# Patient Record
Sex: MALE | Race: WHITE | NOT HISPANIC OR LATINO | Employment: FULL TIME | URBAN - METROPOLITAN AREA
[De-identification: names, ages, dates, MRNs, and addresses within clinical notes are randomized per-mention and may not be internally consistent; named-entity substitution may affect disease eponyms.]

---

## 2024-09-16 ENCOUNTER — HOSPITAL ENCOUNTER (EMERGENCY)
Facility: HOSPITAL | Age: 34
Discharge: HOME | End: 2024-09-16
Attending: EMERGENCY MEDICINE
Payer: COMMERCIAL

## 2024-09-16 VITALS
HEART RATE: 77 BPM | SYSTOLIC BLOOD PRESSURE: 164 MMHG | HEIGHT: 72 IN | OXYGEN SATURATION: 99 % | RESPIRATION RATE: 17 BRPM | BODY MASS INDEX: 33.14 KG/M2 | DIASTOLIC BLOOD PRESSURE: 103 MMHG | WEIGHT: 244.69 LBS | TEMPERATURE: 98 F

## 2024-09-16 DIAGNOSIS — R10.13 EPIGASTRIC PAIN: ICD-10-CM

## 2024-09-16 DIAGNOSIS — K85.90 ACUTE PANCREATITIS WITHOUT INFECTION OR NECROSIS, UNSPECIFIED PANCREATITIS TYPE: Primary | ICD-10-CM

## 2024-09-16 LAB
ALBUMIN SERPL BCP-MCNC: 4.6 G/DL (ref 3.5–5.2)
ALP SERPL-CCNC: 57 U/L (ref 55–135)
ALT SERPL W/O P-5'-P-CCNC: 137 U/L (ref 10–44)
ANION GAP SERPL CALC-SCNC: 14 MMOL/L (ref 8–16)
AST SERPL-CCNC: 67 U/L (ref 10–40)
BASOPHILS # BLD AUTO: 0.04 K/UL (ref 0–0.2)
BASOPHILS NFR BLD: 0.5 % (ref 0–1.9)
BILIRUB SERPL-MCNC: 0.6 MG/DL (ref 0.1–1)
BUN SERPL-MCNC: 13 MG/DL (ref 6–20)
CALCIUM SERPL-MCNC: 10.5 MG/DL (ref 8.7–10.5)
CHLORIDE SERPL-SCNC: 101 MMOL/L (ref 95–110)
CO2 SERPL-SCNC: 24 MMOL/L (ref 23–29)
CREAT SERPL-MCNC: 0.9 MG/DL (ref 0.5–1.4)
DIFFERENTIAL METHOD BLD: ABNORMAL
EOSINOPHIL # BLD AUTO: 0.1 K/UL (ref 0–0.5)
EOSINOPHIL NFR BLD: 1.3 % (ref 0–8)
ERYTHROCYTE [DISTWIDTH] IN BLOOD BY AUTOMATED COUNT: 12.1 % (ref 11.5–14.5)
EST. GFR  (NO RACE VARIABLE): >60 ML/MIN/1.73 M^2
GLUCOSE SERPL-MCNC: 133 MG/DL (ref 70–110)
HCT VFR BLD AUTO: 45.1 % (ref 40–54)
HGB BLD-MCNC: 16.5 G/DL (ref 14–18)
IMM GRANULOCYTES # BLD AUTO: 0.02 K/UL (ref 0–0.04)
IMM GRANULOCYTES NFR BLD AUTO: 0.3 % (ref 0–0.5)
LIPASE SERPL-CCNC: 281 U/L (ref 4–60)
LYMPHOCYTES # BLD AUTO: 1.7 K/UL (ref 1–4.8)
LYMPHOCYTES NFR BLD: 22.5 % (ref 18–48)
MCH RBC QN AUTO: 34.9 PG (ref 27–31)
MCHC RBC AUTO-ENTMCNC: 36.6 G/DL (ref 32–36)
MCV RBC AUTO: 95 FL (ref 82–98)
MONOCYTES # BLD AUTO: 0.5 K/UL (ref 0.3–1)
MONOCYTES NFR BLD: 6 % (ref 4–15)
NEUTROPHILS # BLD AUTO: 5.3 K/UL (ref 1.8–7.7)
NEUTROPHILS NFR BLD: 69.4 % (ref 38–73)
NRBC BLD-RTO: 0 /100 WBC
OHS QRS DURATION: 94 MS
OHS QTC CALCULATION: 450 MS
PLATELET # BLD AUTO: 268 K/UL (ref 150–450)
PMV BLD AUTO: 9.3 FL (ref 9.2–12.9)
POTASSIUM SERPL-SCNC: 3.7 MMOL/L (ref 3.5–5.1)
PROT SERPL-MCNC: 8.1 G/DL (ref 6–8.4)
RBC # BLD AUTO: 4.73 M/UL (ref 4.6–6.2)
SODIUM SERPL-SCNC: 139 MMOL/L (ref 136–145)
WBC # BLD AUTO: 7.69 K/UL (ref 3.9–12.7)

## 2024-09-16 PROCEDURE — 99285 EMERGENCY DEPT VISIT HI MDM: CPT | Mod: 25

## 2024-09-16 PROCEDURE — 25000003 PHARM REV CODE 250: Performed by: REGISTERED NURSE

## 2024-09-16 PROCEDURE — 93005 ELECTROCARDIOGRAM TRACING: CPT

## 2024-09-16 PROCEDURE — 96376 TX/PRO/DX INJ SAME DRUG ADON: CPT

## 2024-09-16 PROCEDURE — 25500020 PHARM REV CODE 255: Performed by: PHYSICIAN ASSISTANT

## 2024-09-16 PROCEDURE — 63600175 PHARM REV CODE 636 W HCPCS: Performed by: REGISTERED NURSE

## 2024-09-16 PROCEDURE — 96375 TX/PRO/DX INJ NEW DRUG ADDON: CPT

## 2024-09-16 PROCEDURE — 80053 COMPREHEN METABOLIC PANEL: CPT | Performed by: PHYSICIAN ASSISTANT

## 2024-09-16 PROCEDURE — 63600175 PHARM REV CODE 636 W HCPCS: Performed by: NURSE PRACTITIONER

## 2024-09-16 PROCEDURE — 83690 ASSAY OF LIPASE: CPT | Performed by: PHYSICIAN ASSISTANT

## 2024-09-16 PROCEDURE — 96374 THER/PROPH/DIAG INJ IV PUSH: CPT | Mod: 59

## 2024-09-16 PROCEDURE — 93010 ELECTROCARDIOGRAM REPORT: CPT | Mod: ,,, | Performed by: INTERNAL MEDICINE

## 2024-09-16 PROCEDURE — 85025 COMPLETE CBC W/AUTO DIFF WBC: CPT | Performed by: PHYSICIAN ASSISTANT

## 2024-09-16 RX ORDER — HYDROMORPHONE HYDROCHLORIDE 1 MG/ML
0.5 INJECTION, SOLUTION INTRAMUSCULAR; INTRAVENOUS; SUBCUTANEOUS
Status: COMPLETED | OUTPATIENT
Start: 2024-09-16 | End: 2024-09-16

## 2024-09-16 RX ORDER — ONDANSETRON 4 MG/1
4 TABLET, ORALLY DISINTEGRATING ORAL EVERY 6 HOURS PRN
Qty: 12 TABLET | Refills: 0 | Status: SHIPPED | OUTPATIENT
Start: 2024-09-16

## 2024-09-16 RX ORDER — ONDANSETRON HYDROCHLORIDE 2 MG/ML
4 INJECTION, SOLUTION INTRAVENOUS
Status: COMPLETED | OUTPATIENT
Start: 2024-09-16 | End: 2024-09-16

## 2024-09-16 RX ORDER — LIDOCAINE HYDROCHLORIDE 20 MG/ML
15 SOLUTION OROPHARYNGEAL ONCE
Status: COMPLETED | OUTPATIENT
Start: 2024-09-16 | End: 2024-09-16

## 2024-09-16 RX ORDER — HYDROCODONE BITARTRATE AND ACETAMINOPHEN 10; 325 MG/1; MG/1
1 TABLET ORAL EVERY 6 HOURS PRN
Qty: 12 TABLET | Refills: 0 | Status: SHIPPED | OUTPATIENT
Start: 2024-09-16

## 2024-09-16 RX ORDER — ALUMINUM HYDROXIDE, MAGNESIUM HYDROXIDE, AND SIMETHICONE 1200; 120; 1200 MG/30ML; MG/30ML; MG/30ML
30 SUSPENSION ORAL ONCE
Status: COMPLETED | OUTPATIENT
Start: 2024-09-16 | End: 2024-09-16

## 2024-09-16 RX ORDER — MORPHINE SULFATE 4 MG/ML
4 INJECTION, SOLUTION INTRAMUSCULAR; INTRAVENOUS
Status: DISCONTINUED | OUTPATIENT
Start: 2024-09-16 | End: 2024-09-16

## 2024-09-16 RX ORDER — HYDROMORPHONE HYDROCHLORIDE 1 MG/ML
1 INJECTION, SOLUTION INTRAMUSCULAR; INTRAVENOUS; SUBCUTANEOUS
Status: COMPLETED | OUTPATIENT
Start: 2024-09-16 | End: 2024-09-16

## 2024-09-16 RX ORDER — SODIUM CHLORIDE 9 MG/ML
1000 INJECTION, SOLUTION INTRAVENOUS
Status: DISCONTINUED | OUTPATIENT
Start: 2024-09-16 | End: 2024-09-16 | Stop reason: HOSPADM

## 2024-09-16 RX ADMIN — LIDOCAINE HYDROCHLORIDE 15 ML: 20 SOLUTION ORAL at 04:09

## 2024-09-16 RX ADMIN — HYDROMORPHONE HYDROCHLORIDE 0.5 MG: 1 INJECTION, SOLUTION INTRAMUSCULAR; INTRAVENOUS; SUBCUTANEOUS at 04:09

## 2024-09-16 RX ADMIN — ALUMINUM HYDROXIDE, MAGNESIUM HYDROXIDE, AND DIMETHICONE 30 ML: 200; 20; 200 SUSPENSION ORAL at 04:09

## 2024-09-16 RX ADMIN — IOHEXOL 100 ML: 350 INJECTION, SOLUTION INTRAVENOUS at 02:09

## 2024-09-16 RX ADMIN — ONDANSETRON 4 MG: 2 INJECTION INTRAMUSCULAR; INTRAVENOUS at 02:09

## 2024-09-16 RX ADMIN — HYDROMORPHONE HYDROCHLORIDE 1 MG: 1 INJECTION, SOLUTION INTRAMUSCULAR; INTRAVENOUS; SUBCUTANEOUS at 02:09

## 2024-09-16 NOTE — ED PROVIDER NOTES
Encounter Date: 9/16/2024       History     Chief Complaint   Patient presents with    Abdominal Pain     Pt reports upper left abdominal pain and midsternal chest pain radiating to his back. Pt reports abdominal pain is sharp and feels like his chronic pancreatitis. -NVD Started yesterday     33-year-old male presents emergency department with complaints of abdominal pain that radiates into his back.  Patient has a history of pancreatitis.  Patient states symptoms are similar to previous pancreatitis flares.  Patient denies any fever, chills, nausea/vomiting, chest pain, shortness of breath, diarrhea or any other symptoms.    The history is provided by the patient.     Review of patient's allergies indicates:  No Known Allergies  History reviewed. No pertinent past medical history.  History reviewed. No pertinent surgical history.  No family history on file.  Social History     Tobacco Use    Smoking status: Unknown     Review of Systems   Constitutional:  Negative for fever.   HENT:  Negative for sore throat.    Respiratory:  Negative for shortness of breath.    Cardiovascular:  Negative for chest pain.   Gastrointestinal:  Positive for abdominal pain. Negative for nausea.   Genitourinary:  Negative for dysuria.   Musculoskeletal:  Negative for back pain.   Skin:  Negative for rash.   Neurological:  Negative for weakness.   Hematological:  Does not bruise/bleed easily.   All other systems reviewed and are negative.      Physical Exam     Initial Vitals [09/16/24 1211]   BP Pulse Resp Temp SpO2   (!) 147/95 (!) 112 18 99 °F (37.2 °C) 98 %      MAP       --         Physical Exam    Constitutional: He appears well-developed and well-nourished. No distress.   HENT:   Head: Normocephalic and atraumatic.   Nose: Nose normal.   Mouth/Throat: Uvula is midline and oropharynx is clear and moist.   Eyes: Conjunctivae and EOM are normal. Pupils are equal, round, and reactive to light.   Neck: Neck supple.   Normal range of  motion.  Cardiovascular:  Normal rate and regular rhythm.           Pulmonary/Chest: Effort normal and breath sounds normal. No respiratory distress. He has no decreased breath sounds. He has no wheezes. He has no rales.   Abdominal: Abdomen is soft. Bowel sounds are normal. There is abdominal tenderness in the right upper quadrant, epigastric area and left upper quadrant.   Mild epigastric tenderness, no rebound, no lower abdominal tenderness   Musculoskeletal:         General: Normal range of motion.      Cervical back: Normal range of motion and neck supple.     Neurological: He is alert and oriented to person, place, and time. He has normal strength. GCS eye subscore is 4. GCS verbal subscore is 5. GCS motor subscore is 6.   Skin: Skin is warm and dry. Capillary refill takes less than 2 seconds. No rash noted.   Psychiatric: He has a normal mood and affect. His speech is normal and behavior is normal.         ED Course   Procedures  Labs Reviewed   CBC W/ AUTO DIFFERENTIAL - Abnormal       Result Value    WBC 7.69      RBC 4.73      Hemoglobin 16.5      Hematocrit 45.1      MCV 95      MCH 34.9 (*)     MCHC 36.6 (*)     RDW 12.1      Platelets 268      MPV 9.3      Immature Granulocytes 0.3      Gran # (ANC) 5.3      Immature Grans (Abs) 0.02      Lymph # 1.7      Mono # 0.5      Eos # 0.1      Baso # 0.04      nRBC 0      Gran % 69.4      Lymph % 22.5      Mono % 6.0      Eosinophil % 1.3      Basophil % 0.5      Differential Method Automated     COMPREHENSIVE METABOLIC PANEL - Abnormal    Sodium 139      Potassium 3.7      Chloride 101      CO2 24      Glucose 133 (*)     BUN 13      Creatinine 0.9      Calcium 10.5      Total Protein 8.1      Albumin 4.6      Total Bilirubin 0.6      Alkaline Phosphatase 57      AST 67 (*)      (*)     eGFR >60      Anion Gap 14     LIPASE - Abnormal    Lipase 281 (*)         ECG Results              EKG 12-lead (Final result)        Collection Time Result Time QRS  Duration [facility] QTC Calculation    09/16/24 12:12:38 09/16/24 20:56:43 94 450                     Final result by Interface, Lab In [facility] (09/16/24 20:56:47)                   Narrative:    Test Reason : R10.13,    Vent. Rate : 103 BPM     Atrial Rate : 103 BPM     P-R Int : 150 ms          QRS Dur : 094 ms      QT Int : 344 ms       P-R-T Axes : 055 002 054 degrees     QTc Int : 450 ms    Sinus tachycardia  Otherwise normal ECG  No previous ECGs available  Confirmed by ANGEL FALL MD ([id number]) on 9/16/2024 8:56:36 PM    Referred By: [email or web address]   SELF           Confirmed By:ANGEL FALL MD                                  Imaging Results              CT Abdomen Pelvis With IV Contrast NO Oral Contrast (Final result)  Result time 09/16/24 15:08:53      Final result by Geoff Sanchez MD (09/16/24 15:08:53)                   Impression:      Mild hepatomegaly with low-grade fatty infiltration of the liver.    Mild peripancreatic edema notably at the pancreatic head level compatible with acute pancreatitis.  Correlation with clinical and laboratory findings suggested.    Normal appendix.      Electronically signed by: Geoff Sanchez MD  Date:    09/16/2024  Time:    15:08               Narrative:    EXAMINATION:  CT ABDOMEN PELVIS WITH IV CONTRAST    CLINICAL HISTORY:  Acute epigastric pain    TECHNIQUE:  Standard contrast enhanced CT performed with  ml Omnipaque 350.  with reformats.  All CT scans at this facility are performed  using dose modulation techniques as appropriate to performed exam including the following:  automated exposure control; adjustment of mA and/or kV according to the patients size (this includes techniques or standardized protocols for targeted exams where dose is matched to indication/reason for exam: i.e. extremities or head);  iterative reconstruction technique.    COMPARISON:  None    FINDINGS:  Lung bases are clear.    Low attenuation enlarged liver with a vertical length of 24  cm.    The spleen appears unremarkable.    There is mild peripancreatic edema notably at the pancreatic head level.  Possible low-grade acute pancreatitis.  Correlation with clinical and laboratory findings suggested.    The adrenal glands and kidneys appear normal.    The aorta and IVC appear normal.    The bowel loops are nondilated.  The appendix is normal.    In the pelvis the bladder and prostate gland appear normal.  No free fluid or ascites.                                       Medications   ondansetron injection 4 mg (4 mg Intravenous Given 9/16/24 1415)   HYDROmorphone injection 1 mg (1 mg Intravenous Given 9/16/24 1417)   iohexoL (OMNIPAQUE 350) injection 100 mL (100 mLs Intravenous Given 9/16/24 1454)   aluminum-magnesium hydroxide-simethicone 200-200-20 mg/5 mL suspension 30 mL (30 mLs Oral Given 9/16/24 1602)     And   LIDOcaine viscous HCl 2% oral solution 15 mL (15 mLs Oral Given 9/16/24 1602)   HYDROmorphone injection 0.5 mg (0.5 mg Intravenous Given 9/16/24 1605)     Medical Decision Making  Amount and/or Complexity of Data Reviewed  Labs: ordered.     Details: Lipase 281  AST 67      Discussion of management or test interpretation with external provider(s): Patient declined admission.  Patient states that he will follow up with his primary care in [facility].  Patient was advised to return emergency department for any worsening symptoms.  Patient did have improvement after fluids and medications.  Patient given pancreatitis discharge instructions.  Return ED indications provided.    Risk  OTC drugs.  Prescription drug management.  Risk Details: I discussed with patient and/or family/caretaker that evaluation in the ED does not suggest any emergent or life threatening medical conditions requiring immediate intervention beyond what was provided in the ED, and I believe patient is safe for discharge.  Regardless, an unremarkable evaluation in the ED does not preclude the  development or presence of a serious of life threatening condition. As such, patient was instructed to return immediately for any worsening or change in current symptoms.                                        Clinical Impression:  Final diagnoses:  [R10.13] Epigastric pain  [K85.90] Acute pancreatitis without infection or necrosis, unspecified pancreatitis type (Primary)          ED Disposition Condition    Discharge Stable          ED Prescriptions       Medication Sig Dispense Start Date End Date Auth. Provider    HYDROcodone-acetaminophen (NORCO)  mg per tablet Take 1 tablet by mouth every 6 (six) hours as needed for Pain. 12 tablet 9/16/2024 -- Calos Manzanares Jr., FNP    ondansetron (ZOFRAN-ODT) 4 MG TbDL Take 1 tablet (4 mg total) by mouth every 6 (six) hours as needed (vomiting). 12 tablet 9/16/2024 -- Calos Manzanares Jr., FNP          Follow-up Information       Follow up With Specialties Details Why Contact Info    [facility] - Emergency Dept. Emergency Medicine  If symptoms worsen [id number] [facility] [id number] [phone]             Calos Manzanares Jr., FNP  09/17/24 1111

## 2024-09-16 NOTE — FIRST PROVIDER EVALUATION
Emergency Department TeleTriage Encounter Note      CHIEF COMPLAINT    Chief Complaint   Patient presents with    Abdominal Pain     Pt reports upper left abdominal pain and midsternal chest pain radiating to his back. Pt reports abdominal pain is sharp and feels like his chronic pancreatitis. -NVD Started yesterday       VITAL SIGNS   Initial Vitals [09/16/24 1211]   BP Pulse Resp Temp SpO2   (!) 147/95 (!) 112 18 99 °F (37.2 °C) 98 %      MAP       --            ALLERGIES    Review of patient's allergies indicates:  No Known Allergies    PROVIDER TRIAGE NOTE  This is a teletriage evaluation of a 33 y.o. male presenting to the ED complaining of abdominal pain. Patient reports epigastric pain that started yesterday. He took ibuprofen and tylenol without relief. He denies nausea, vomiting, or diarrhea.    Patient is alert and oriented. He speaks in complete sentences. He is sitting upright in the chair in no distress.     Initial orders will be placed and care will be transferred to an alternate provider when patient is roomed for a full evaluation. Any additional orders and the final disposition will be determined by that provider.         ORDERS  Labs Reviewed   CBC W/ AUTO DIFFERENTIAL   COMPREHENSIVE METABOLIC PANEL   LIPASE   URINALYSIS, REFLEX TO URINE CULTURE       ED Orders (720h ago, onward)      Start Ordered     Status Ordering Provider    09/16/24 1253 09/16/24 1252  Vital signs  Every 2 hours         Ordered ANITA, MELE G.    09/16/24 1252 09/16/24 1252  Diet NPO  Diet effective now         Ordered ANITA, MELE G.    09/16/24 1252 09/16/24 1252  Insert peripheral IV  Once         Ordered ANITA, MELE G.    09/16/24 1252 09/16/24 1252  CBC W/ AUTO DIFFERENTIAL  STAT         Ordered ANITA, MELE G.    09/16/24 1252 09/16/24 1252  Comp. Metabolic Panel  STAT         Ordered ANITA, MELE G.    09/16/24 1252 09/16/24 1252  Lipase  STAT         Ordered ANITA, MELE G.    09/16/24 1252 09/16/24 1252   Urinalysis, Reflex to Urine Culture Urine, Clean Catch  STAT         Ordered MELE HOWARD.    09/16/24 1252 09/16/24 1252  EKG 12-lead  Once         Completed by MATT BRICE on 9/16/2024 at 12:52 PM MELE HOWARD.    09/16/24 1252 09/16/24 1252  CT Abdomen Pelvis With IV Contrast NO Oral Contrast  1 time imaging         Ordered MELE HOWARDEvelin              Virtual Visit Note: The provider triage portion of this emergency department evaluation and documentation was performed via [employer], a HIPAA-compliant telemedicine application, in concert with a tele-presenter in the room. A face to face patient evaluation with one of my colleagues will occur once the patient is placed in an emergency department room.      DISCLAIMER: This note was prepared with [employer] voice recognition transcription software. Garbled syntax, mangled pronouns, and other bizarre constructions may be attributed to that software system.